# Patient Record
Sex: FEMALE | Race: WHITE
[De-identification: names, ages, dates, MRNs, and addresses within clinical notes are randomized per-mention and may not be internally consistent; named-entity substitution may affect disease eponyms.]

---

## 2017-09-19 ENCOUNTER — HOSPITAL ENCOUNTER (OUTPATIENT)
Dept: HOSPITAL 80 - FIMAGING | Age: 72
End: 2017-09-19
Attending: INTERNAL MEDICINE
Payer: COMMERCIAL

## 2017-09-19 DIAGNOSIS — M15.4: Primary | ICD-10-CM

## 2017-12-20 ENCOUNTER — HOSPITAL ENCOUNTER (OUTPATIENT)
Dept: HOSPITAL 80 - FIMAGING | Age: 72
End: 2017-12-20
Attending: INTERNAL MEDICINE
Payer: COMMERCIAL

## 2017-12-20 DIAGNOSIS — Z12.31: Primary | ICD-10-CM

## 2017-12-20 DIAGNOSIS — M81.0: ICD-10-CM

## 2017-12-20 DIAGNOSIS — Z13.820: ICD-10-CM

## 2017-12-20 PROCEDURE — G0202 SCR MAMMO BI INCL CAD: HCPCS

## 2017-12-22 ENCOUNTER — HOSPITAL ENCOUNTER (OUTPATIENT)
Dept: HOSPITAL 80 - FIMAGING | Age: 72
End: 2017-12-22
Attending: INTERNAL MEDICINE
Payer: COMMERCIAL

## 2017-12-22 DIAGNOSIS — R91.8: Primary | ICD-10-CM

## 2017-12-22 DIAGNOSIS — J21.9: ICD-10-CM

## 2017-12-22 DIAGNOSIS — J47.9: ICD-10-CM

## 2018-03-14 ENCOUNTER — HOSPITAL ENCOUNTER (OUTPATIENT)
Dept: HOSPITAL 80 - FSGY | Age: 73
Discharge: HOME | End: 2018-03-14
Attending: INTERNAL MEDICINE
Payer: COMMERCIAL

## 2018-03-14 VITALS — SYSTOLIC BLOOD PRESSURE: 118 MMHG | DIASTOLIC BLOOD PRESSURE: 68 MMHG

## 2018-03-14 VITALS — HEART RATE: 65 BPM | TEMPERATURE: 97.5 F

## 2018-03-14 VITALS — RESPIRATION RATE: 15 BRPM | OXYGEN SATURATION: 94 %

## 2018-03-14 DIAGNOSIS — H40.9: ICD-10-CM

## 2018-03-14 DIAGNOSIS — E78.5: ICD-10-CM

## 2018-03-14 DIAGNOSIS — J47.9: Primary | ICD-10-CM

## 2018-03-14 DIAGNOSIS — M81.0: ICD-10-CM

## 2018-03-14 DIAGNOSIS — M06.9: ICD-10-CM

## 2018-03-14 DIAGNOSIS — G47.00: ICD-10-CM

## 2018-03-14 PROCEDURE — 0B958ZX DRAINAGE OF RIGHT MIDDLE LOBE BRONCHUS, VIA NATURAL OR ARTIFICIAL OPENING ENDOSCOPIC, DIAGNOSTIC: ICD-10-PCS | Performed by: INTERNAL MEDICINE

## 2018-03-14 NOTE — BVPULMO
[facility]

Surgical Services- Pulmonology

_____________________________________________________________________________________________________
_______

Patient Name: Denise Salamanca                           Procedure Date: 3/14/2018 10:30 AM

MRN: [id number]                                      Account Number: [id number]

Patient Type: Outpatient                             Attending MD/ER Physician: Paco Trujillo MD

_____________________________________________________________________________________________________
_______

 

Procedure:            Bronchoscopy

Indications:          Unresolving right upper lobe infiltrate, Unresolving right middle lobe infiltra
te

Providers:            Paco Trujillo MD

Medicines:            Lidocaine 4% via nebulizer with Albuterol 2.5 mg, Fentanyl 75 mcg IV, Midazolam
 3 mg 

                      mg IV, Lidocaine 1% applied to cords 2 mL, Lidocaine 1% applied to the 

                      tracheobronchial tree 4 mL, Oxygen 4 L/min

Complications:        No immediate complications

Procedure:            After informed consent, a time out was performed. N95 masks were worn, and the 


                      procedure was done in a negative pressure room. The patient was given appropria
te 

                      topical anesthesia and intravenous sedation. The fiberopic bronchoscope was pas
sed 

                      via a bite block orally into the larynx and subsequently into the lower trachea
 

                      bronchial tree. Throughout the procedure, the patient's blood pressure, pulse, 
and 

                      oxygen saturations were monitored continuously. The Bronchoscope was introduced
 

                      through the mouth and advanced to the tracheobronchial tree. The procedure was 


                      accomplished without difficulty. The patient tolerated the procedure well. The 
total 

                      duration of the procedure was 20 minutes.

Findings:             Respiratory tract: The larynx is normal. The vocal cords appear normal. The 

                      subglottic space is normal. The trachea is of normal caliber. The vera is sha
rp. 

                      The entire tracheobronchial tree was examined to at least the first subsegmenta
l 

                      level. Bronchial mucosa and anatomy are normal; there are no endobronchial lesi
ons 

                      and no secretions, except in the right middle lobe where there was copious amou
nt of 

                      creamy tan secretions.

                      Bronchoalveolar lavage was performed in the right middle lobe of the lung and s
ent 

                      for cell count, bacterial culture, viral smears & culture, and fungal & AFB anne
lysis 

                      and cytology and routine cytology. 60 mL of fluid were instilled. 40 mL were 

                      returned. The return was cloudy. There were no mucoid plugs in the return fluid
.

Post Op Diagnosis:    - Unresolving right upper lobe infiltrate

                      - Unresolving right middle lobe infiltrate

                      - Bronchoalveolar lavage was performed.

                      - The examination was normal.

                      - Infiltrate

Estimated Blood Loss: Estimated blood loss: none.

Recommendation:       - Await BAL results.

                      - Follow up with bronchoscopist in 1 month.

Attending Participation:

     I personally performed the entire procedure.

 

Paco Trujillo MD

_________________

Paco Trujillo MD

3/14/2018 11:29:28 AM

This report has been signed electronicallyThomas MD Ronnie

Number of Addenda: 0

 

Note Initiated On: 3/14/2018 10:30 AM

[email or web address]

## 2018-10-16 ENCOUNTER — HOSPITAL ENCOUNTER (OUTPATIENT)
Dept: HOSPITAL 80 - FIMAGING | Age: 73
End: 2018-10-16
Attending: INTERNAL MEDICINE
Payer: COMMERCIAL

## 2018-10-16 DIAGNOSIS — I70.0: ICD-10-CM

## 2018-10-16 DIAGNOSIS — M51.34: ICD-10-CM

## 2018-10-16 DIAGNOSIS — A31.0: Primary | ICD-10-CM

## 2019-01-21 ENCOUNTER — HOSPITAL ENCOUNTER (OUTPATIENT)
Dept: HOSPITAL 80 - FIMAGING | Age: 74
End: 2019-01-21
Attending: INTERNAL MEDICINE
Payer: COMMERCIAL

## 2019-01-21 DIAGNOSIS — Z12.31: Primary | ICD-10-CM

## 2019-04-22 ENCOUNTER — HOSPITAL ENCOUNTER (OUTPATIENT)
Dept: HOSPITAL 80 - FIMAGING | Age: 74
End: 2019-04-22
Attending: INTERNAL MEDICINE
Payer: COMMERCIAL

## 2019-04-22 DIAGNOSIS — A31.0: Primary | ICD-10-CM
